# Patient Record
Sex: FEMALE | Race: AMERICAN INDIAN OR ALASKA NATIVE
[De-identification: names, ages, dates, MRNs, and addresses within clinical notes are randomized per-mention and may not be internally consistent; named-entity substitution may affect disease eponyms.]

---

## 2020-03-30 ENCOUNTER — HOSPITAL ENCOUNTER (EMERGENCY)
Dept: HOSPITAL 5 - ED | Age: 50
Discharge: HOME | End: 2020-03-30
Payer: SELF-PAY

## 2020-03-30 VITALS — DIASTOLIC BLOOD PRESSURE: 82 MMHG | SYSTOLIC BLOOD PRESSURE: 143 MMHG

## 2020-03-30 DIAGNOSIS — K04.7: Primary | ICD-10-CM

## 2020-03-30 DIAGNOSIS — Z79.899: ICD-10-CM

## 2020-03-30 PROCEDURE — 99282 EMERGENCY DEPT VISIT SF MDM: CPT

## 2020-03-30 NOTE — EMERGENCY DEPARTMENT REPORT
Abscess Boil HPI





- HPI


Chief Complaint: Dental/Oral


Stated Complaint: JAW ABSCESS


Time Seen by Provider: 03/30/20 09:44


Duration: 3 Days


Severity: Moderate


History: Yes Pain, No Fever, No Purulent Drainage, No Numbness, No Foreign Body,

No Previous History, No Insect Bite


HPI: This [age]-year-old female presents with right lower dental swelling and 

pain x2 to 3 days.  Patient states that swelling began 2 days ago.  Patient 

states that pain is not relieved with any medication.  She denies fever, throat 

pain, coughing, any other symptoms.


Home Medications: 


                                  Previous Rx's











 Medication  Instructions  Recorded  Last Taken  Type


 


Clindamycin [Clindamycin CAP] 300 mg PO Q8H #21 cap 03/30/20 Unknown Rx


 


Ibuprofen [Motrin] 800 mg PO Q8HR #30 tablet 03/30/20 Unknown Rx











Allergies/Adverse Reactions: 


                                    Allergies











Allergy/AdvReac Type Severity Reaction Status Date / Time


 


No Known Allergies Allergy   Unverified 03/30/20 08:10














ED Review of Systems


ROS: 


Stated complaint: JAW ABSCESS


Other details as noted in HPI





Comment: All other systems reviewed and negative





ED Past Medical Hx





- Past Medical History


Previous Medical History?: No





- Surgical History


Past Surgical History?: No





- Social History


Smoking Status: Never Smoker


Substance Use Type: None





- Medications


Home Medications: 


                                Home Medications











 Medication  Instructions  Recorded  Confirmed  Last Taken  Type


 


Clindamycin [Clindamycin CAP] 300 mg PO Q8H #21 cap 03/30/20  Unknown Rx


 


Ibuprofen [Motrin] 800 mg PO Q8HR #30 tablet 03/30/20  Unknown Rx














ED Abscess Boil Physical Exam





- Exam


General: 


Vital signs noted. No distress. Alert and acting appropriately.





Size: 1 cm


Exam: Yes Tenderness, Yes Surrounding Cellulites/Erythema (gingiva 28,29), Yes 

Normal Neurologic Exam, Yes Normal Circulation, No Fluctuance, No Lymphangitis, 

No Crepitation, No Heart Murmur





I & D Note





- I & D Note


I & D Note: Not indicated as this is a dental abscess





ED Course


                                   Vital Signs











  03/30/20





  08:11


 


Temperature 98.7 F


 


Pulse Rate 77


 


Respiratory 18





Rate 


 


Blood Pressure 143/82


 


O2 Sat by Pulse 99





Oximetry 











Critical care attestation.: 


If time is entered above; I have spent that time in minutes in the direct care 

of this critically ill patient, excluding procedure time.








ED Medical Decision Making





- Medical Decision Making





49-year-old female who presents with right-sided Facial pain and swelling 

secondary to odontogenic abscess


ED course:  


Based upon  history and physical examination,  pain is a result of an infection 

of tooth number 28, 29and that the pain Pt feels on the right side of his face 

is referred pain from this infectious process.  


Pt has no evidence of acute impending airway compromise.


At this point, patient will be discharged home on some antibiotics and pain 

trial,  she will do well with an outpatient course of antibiotics. 


Follow up with the Dental Clinic as referred


Vital signs are normal patient is in no acute distress.


Pt had an effect uneventful ED stay





ED Disposition


Clinical Impression: 


 Dental abscess





Disposition: DC-01 TO HOME OR SELFCARE


Is pt being admited?: No


Does the pt Need Aspirin: No


Condition: Stable


Instructions:  Dental Abscess (ED)


Additional Instructions: 


Make sure to follow up with the dentist as discussed.


Take all your medications as you've been prescribed.


If you have any worsening symptoms or develop new symptoms please return to ED 

immediately.


Prescriptions: 


Clindamycin [Clindamycin CAP] 300 mg PO Q8H #21 cap


Ibuprofen [Motrin] 800 mg PO Q8HR #30 tablet


Referrals: 


[facility] [Primary Care Provider] - 3-5 Days


[facility] [Outside] - 3-5 Days


[facility] [Outside] - 3-5 Days


Forms:  Accompanied Note, Work/School Release Form(ED)


Time of Disposition: 10:21